# Patient Record
Sex: MALE | Race: WHITE | Employment: FULL TIME | URBAN - METROPOLITAN AREA
[De-identification: names, ages, dates, MRNs, and addresses within clinical notes are randomized per-mention and may not be internally consistent; named-entity substitution may affect disease eponyms.]

---

## 2019-04-01 ENCOUNTER — OFFICE VISIT (OUTPATIENT)
Dept: CARDIOLOGY CLINIC | Age: 54
End: 2019-04-01

## 2019-04-01 VITALS
OXYGEN SATURATION: 98 % | SYSTOLIC BLOOD PRESSURE: 122 MMHG | WEIGHT: 220.6 LBS | HEIGHT: 70 IN | DIASTOLIC BLOOD PRESSURE: 82 MMHG | HEART RATE: 73 BPM | BODY MASS INDEX: 31.58 KG/M2

## 2019-04-01 DIAGNOSIS — E78.2 MIXED HYPERLIPIDEMIA: ICD-10-CM

## 2019-04-01 DIAGNOSIS — Z82.49 FAMILY HISTORY OF ISCHEMIC HEART DISEASE: ICD-10-CM

## 2019-04-01 DIAGNOSIS — R73.03 PREDIABETES: ICD-10-CM

## 2019-04-01 DIAGNOSIS — K21.9 GASTROESOPHAGEAL REFLUX DISEASE WITHOUT ESOPHAGITIS: ICD-10-CM

## 2019-04-01 DIAGNOSIS — R23.2 FLUSHING: Primary | ICD-10-CM

## 2019-04-01 RX ORDER — LEVOTHYROXINE SODIUM 75 UG/1
TABLET ORAL
COMMUNITY

## 2019-04-01 NOTE — PROGRESS NOTES
Jennifer Tatum [address]  [address]  [place], [address] [facility]    Office [phone]  Fax [phone]  Cell [phone]         Emaline April is a 52 y.o. male self referred for evaluation of flushing. Diagnoses  Encounter Diagnoses   Name Primary?  Family history of ischemic heart disease     Mixed hyperlipidemia     Flushing Yes    Prediabetes     Gastroesophageal reflux disease without esophagitis        Assessment/Recommendations:    Spells of flushing around meal times suggest more hormonal issue than CV problem. Recently dx with hypothyroidism, started on replacement Rx. Consider endocrinology evaluation if sx progress. Intermediate CAD risk (dyslipidemia, prediabetes, family hx (brother) - favor CT heart scan. He is already on aspirin and statin Rx    GERD - persistent sx on PPI rx    I will see him back as needed    Subjective:  No prior cardiac hx. I saw him years ago for chest pain - normal stress nuclear study. He has dyslipidemia/prediabetes/GERD, recently dx hypothyroidism. Reports episodes of flushing when starts to eat food. Has intermittent, almost daily GERD despite PPI. Red tomato sauce biggest culprit. Patient denies any exertional chest pain, dyspnea, palpitations, syncope, orthopnea, edema or paroxysmal nocturnal dyspnea. Cardiac risk factors   HTN no  DM  yes  Smoking no  Family hx of CAD yes    SH:     Cardiac testing  No specialty comments available. Past Medical History:   Diagnosis Date    Family history of ischemic heart disease 1/10/2011    General fatigue 1/10/2011    Mixed hyperlipidemia 1/10/2011        Current Outpatient Medications   Medication Sig Dispense Refill    levothyroxine (SYNTHROID) 75 mcg tablet Take  by mouth Daily (before breakfast).  zolpidem (AMBIEN) 5 mg tablet Take 10 mg by mouth nightly as needed.  rosuvastatin (CRESTOR) 10 mg tablet Take 10 mg by mouth daily.       FLUTICASONE PROPIONATE (FLONASE NA) by Nasal route.  aspirin 81 mg tablet Take  by mouth daily.  multivitamin (ONE A DAY) tablet Take 1 Tab by mouth daily.  pantoprazole (PROTONIX) 40 mg tablet Take 40 mg by mouth daily.  cetirizine (ZYRTEC) 10 mg tablet Take  by mouth daily. Allergies   Allergen Reactions    Amoxicillin Unknown (comments)    Azithromycin Unknown (comments)    Bactrim [Sulfamethoprim Ds] Unknown (comments)    Bee Sting [Sting, Bee] Unknown (comments)    Biaxin [Clarithromycin] Unknown (comments)    Lipitor [Atorvastatin] Other (comments)     Elevated LFT's.  Neomycin Unknown (comments)    Neosporin [Neomycin-Bacitracin-Polymyxin] Unknown (comments)    Percocet [Oxycodone-Acetaminophen] Unknown (comments)    Zocor [Simvastatin] Other (comments)     Elevated LFT's. Review of Symptoms:  Constitutional: negative for fevers, chills, malaise, anorexia and weight loss  Respiratory: negative for cough, sputum, hemoptysis or pleurisy/chest pain  Gastrointestinal: positive for dyspepsia and reflux symptoms, negative for dysphagia, odynophagia, nausea, vomiting, melena and abdominal pain  Musculoskeletal:negative for myalgias and arthralgias  Neurological: negative for seizures and memory problems        Physical Exam    Visit Vitals  /82 (BP 1 Location: Right arm, BP Patient Position: Sitting)   Pulse 73   Ht 5' 10\" (1.778 m)   Wt 220 lb 9.6 oz (100.1 kg)   SpO2 98%   BMI 31.65 kg/m²     General - WDWN, over weight  HEENT: Eyes without jaundice, Hearing intact  Neck - JVP normal, thyroid nl  Cardiac - normal S1,S2, no murmurs, rubs or gallops.  No clicks  Vascular - carotids without bruits, radials, femorals and pedal pulses equal bilateral  Lungs - clear to auscultation bilaterals, no rales ,wheezing or rhonchi  Abd - soft nontender, no HSM, no abd bruits  Extremities - no edema  Neuro - nonfocal, normal gait  Psych - mood and affect normal    Cardiographics    ECG: normal EKG, normal sinus rhythm

## 2019-04-01 NOTE — PROGRESS NOTES
Patient has shortness of breath   Patient says he sweats a lot and has no energy.   Patient says that getting up he feels dizzy     Visit Vitals  /82 (BP 1 Location: Right arm, BP Patient Position: Sitting)   Pulse 73   Ht 5' 10\" (1.778 m)   Wt 220 lb 9.6 oz (100.1 kg)   SpO2 98%   BMI 31.65 kg/m²

## 2019-12-16 ENCOUNTER — HOSPITAL ENCOUNTER (OUTPATIENT)
Dept: CT IMAGING | Age: 54
Discharge: HOME | End: 2019-12-16
Attending: NURSE PRACTITIONER
Payer: COMMERCIAL

## 2019-12-16 DIAGNOSIS — R10.32 LEFT LOWER QUADRANT PAIN: ICD-10-CM

## 2019-12-16 PROCEDURE — 74011636320 HC RX REV CODE- 636/320: Performed by: RADIOLOGY

## 2019-12-16 PROCEDURE — 74177 CT ABD & PELVIS W/CONTRAST: CPT

## 2019-12-16 RX ADMIN — IOPAMIDOL 100 ML: 755 INJECTION, SOLUTION INTRAVENOUS at 19:03

## 2021-04-09 ENCOUNTER — APPOINTMENT (OUTPATIENT)
Dept: CT IMAGING | Age: 56
End: 2021-04-09
Attending: STUDENT IN AN ORGANIZED HEALTH CARE EDUCATION/TRAINING PROGRAM
Payer: COMMERCIAL

## 2021-04-09 ENCOUNTER — HOSPITAL ENCOUNTER (EMERGENCY)
Age: 56
Discharge: HOME | End: 2021-04-09
Attending: STUDENT IN AN ORGANIZED HEALTH CARE EDUCATION/TRAINING PROGRAM
Payer: COMMERCIAL

## 2021-04-09 VITALS
DIASTOLIC BLOOD PRESSURE: 76 MMHG | OXYGEN SATURATION: 98 % | TEMPERATURE: 98.5 F | HEART RATE: 86 BPM | SYSTOLIC BLOOD PRESSURE: 122 MMHG | RESPIRATION RATE: 16 BRPM

## 2021-04-09 DIAGNOSIS — N20.1 LEFT URETERAL STONE: Primary | ICD-10-CM

## 2021-04-09 LAB
ALBUMIN SERPL-MCNC: 3.9 G/DL (ref 3.5–5)
ALBUMIN/GLOB SERPL: 1 {RATIO} (ref 1.1–2.2)
ALP SERPL-CCNC: 103 U/L (ref 45–117)
ALT SERPL-CCNC: 47 U/L (ref 12–78)
ANION GAP BLD CALC-SCNC: 16 MMOL/L (ref 10–20)
ANION GAP SERPL CALC-SCNC: 4 MMOL/L (ref 5–15)
AST SERPL-CCNC: 30 U/L (ref 15–37)
BASOPHILS # BLD: 0.1 K/UL (ref 0–0.1)
BASOPHILS NFR BLD: 1 % (ref 0–1)
BILIRUB SERPL-MCNC: 0.9 MG/DL (ref 0.2–1)
BUN BLD-MCNC: 22 MG/DL (ref 9–20)
BUN SERPL-MCNC: 23 MG/DL (ref 6–20)
BUN/CREAT SERPL: 17 (ref 12–20)
CA-I BLD-MCNC: 1.25 MMOL/L (ref 1.12–1.32)
CALCIUM SERPL-MCNC: 9.1 MG/DL (ref 8.5–10.1)
CHLORIDE BLD-SCNC: 102 MMOL/L (ref 98–107)
CHLORIDE SERPL-SCNC: 106 MMOL/L (ref 97–108)
CO2 BLD-SCNC: 28 MMOL/L (ref 21–32)
CO2 SERPL-SCNC: 29 MMOL/L (ref 21–32)
COMMENT, HOLDF: NORMAL
CREAT BLD-MCNC: 1.3 MG/DL (ref 0.6–1.3)
CREAT SERPL-MCNC: 1.32 MG/DL (ref 0.7–1.3)
DIFFERENTIAL METHOD BLD: ABNORMAL
EOSINOPHIL # BLD: 0.1 K/UL (ref 0–0.4)
EOSINOPHIL NFR BLD: 2 % (ref 0–7)
ERYTHROCYTE [DISTWIDTH] IN BLOOD BY AUTOMATED COUNT: 13.1 % (ref 11.5–14.5)
GLOBULIN SER CALC-MCNC: 3.9 G/DL (ref 2–4)
GLUCOSE BLD-MCNC: 131 MG/DL (ref 65–100)
GLUCOSE SERPL-MCNC: 145 MG/DL (ref 65–100)
HCT VFR BLD AUTO: 44.3 % (ref 36.6–50.3)
HCT VFR BLD CALC: 43 % (ref 36.6–50.3)
HGB BLD-MCNC: 14.8 G/DL (ref 12.1–17)
IMM GRANULOCYTES # BLD AUTO: 0 K/UL (ref 0–0.04)
IMM GRANULOCYTES NFR BLD AUTO: 0 % (ref 0–0.5)
LIPASE SERPL-CCNC: 79 U/L (ref 73–393)
LYMPHOCYTES # BLD: 2.8 K/UL (ref 0.8–3.5)
LYMPHOCYTES NFR BLD: 33 % (ref 12–49)
MCH RBC QN AUTO: 31.6 PG (ref 26–34)
MCHC RBC AUTO-ENTMCNC: 33.4 G/DL (ref 30–36.5)
MCV RBC AUTO: 94.5 FL (ref 80–99)
MONOCYTES # BLD: 1.1 K/UL (ref 0–1)
MONOCYTES NFR BLD: 13 % (ref 5–13)
NEUTS SEG # BLD: 4.4 K/UL (ref 1.8–8)
NEUTS SEG NFR BLD: 51 % (ref 32–75)
NRBC # BLD: 0 K/UL (ref 0–0.01)
NRBC BLD-RTO: 0 PER 100 WBC
PLATELET # BLD AUTO: 325 K/UL (ref 150–400)
PMV BLD AUTO: 9.8 FL (ref 8.9–12.9)
POTASSIUM BLD-SCNC: 3.9 MMOL/L (ref 3.5–5.1)
POTASSIUM SERPL-SCNC: 3.8 MMOL/L (ref 3.5–5.1)
PROT SERPL-MCNC: 7.8 G/DL (ref 6.4–8.2)
RBC # BLD AUTO: 4.69 M/UL (ref 4.1–5.7)
SAMPLES BEING HELD,HOLD: NORMAL
SERVICE CMNT-IMP: ABNORMAL
SODIUM BLD-SCNC: 141 MMOL/L (ref 136–145)
SODIUM SERPL-SCNC: 139 MMOL/L (ref 136–145)
WBC # BLD AUTO: 8.5 K/UL (ref 4.1–11.1)

## 2021-04-09 PROCEDURE — 36415 COLL VENOUS BLD VENIPUNCTURE: CPT

## 2021-04-09 PROCEDURE — 99284 EMERGENCY DEPT VISIT MOD MDM: CPT

## 2021-04-09 PROCEDURE — 83690 ASSAY OF LIPASE: CPT

## 2021-04-09 PROCEDURE — 80047 BASIC METABLC PNL IONIZED CA: CPT

## 2021-04-09 PROCEDURE — 96375 TX/PRO/DX INJ NEW DRUG ADDON: CPT

## 2021-04-09 PROCEDURE — 80053 COMPREHEN METABOLIC PANEL: CPT

## 2021-04-09 PROCEDURE — 85025 COMPLETE CBC W/AUTO DIFF WBC: CPT

## 2021-04-09 PROCEDURE — 74011250636 HC RX REV CODE- 250/636: Performed by: STUDENT IN AN ORGANIZED HEALTH CARE EDUCATION/TRAINING PROGRAM

## 2021-04-09 PROCEDURE — 96374 THER/PROPH/DIAG INJ IV PUSH: CPT

## 2021-04-09 PROCEDURE — 74176 CT ABD & PELVIS W/O CONTRAST: CPT

## 2021-04-09 RX ORDER — NAPROXEN 500 MG/1
500 TABLET ORAL 2 TIMES DAILY WITH MEALS
Qty: 20 TAB | Refills: 0 | Status: SHIPPED | OUTPATIENT
Start: 2021-04-09

## 2021-04-09 RX ORDER — ONDANSETRON 2 MG/ML
4 INJECTION INTRAMUSCULAR; INTRAVENOUS
Status: COMPLETED | OUTPATIENT
Start: 2021-04-09 | End: 2021-04-09

## 2021-04-09 RX ORDER — ROSUVASTATIN CALCIUM 40 MG/1
TABLET, COATED ORAL
COMMUNITY
Start: 2021-02-26

## 2021-04-09 RX ORDER — ONDANSETRON 4 MG/1
4 TABLET, ORALLY DISINTEGRATING ORAL
Qty: 12 TAB | Refills: 0 | Status: SHIPPED | OUTPATIENT
Start: 2021-04-09

## 2021-04-09 RX ORDER — LEVOTHYROXINE SODIUM 88 UG/1
TABLET ORAL
COMMUNITY
Start: 2021-02-26

## 2021-04-09 RX ORDER — KETOROLAC TROMETHAMINE 30 MG/ML
30 INJECTION, SOLUTION INTRAMUSCULAR; INTRAVENOUS
Status: COMPLETED | OUTPATIENT
Start: 2021-04-09 | End: 2021-04-09

## 2021-04-09 RX ORDER — FENTANYL CITRATE 50 UG/ML
50 INJECTION, SOLUTION INTRAMUSCULAR; INTRAVENOUS
Status: COMPLETED | OUTPATIENT
Start: 2021-04-09 | End: 2021-04-09

## 2021-04-09 RX ORDER — HYDROCODONE BITARTRATE AND ACETAMINOPHEN 5; 325 MG/1; MG/1
1 TABLET ORAL
Qty: 20 TAB | Refills: 0 | Status: SHIPPED | OUTPATIENT
Start: 2021-04-09 | End: 2021-04-14

## 2021-04-09 RX ORDER — TAMSULOSIN HYDROCHLORIDE 0.4 MG/1
0.4 CAPSULE ORAL DAILY
Qty: 15 CAP | Refills: 0 | Status: SHIPPED | OUTPATIENT
Start: 2021-04-09 | End: 2021-04-24

## 2021-04-09 RX ADMIN — FENTANYL CITRATE 50 MCG: 50 INJECTION, SOLUTION INTRAMUSCULAR; INTRAVENOUS at 03:08

## 2021-04-09 RX ADMIN — ONDANSETRON 4 MG: 2 INJECTION INTRAMUSCULAR; INTRAVENOUS at 03:07

## 2021-04-09 RX ADMIN — SODIUM CHLORIDE 1000 ML: 9 INJECTION, SOLUTION INTRAVENOUS at 03:06

## 2021-04-09 RX ADMIN — KETOROLAC TROMETHAMINE 30 MG: 30 INJECTION, SOLUTION INTRAMUSCULAR at 03:07

## 2021-04-09 RX ADMIN — SODIUM CHLORIDE 1000 ML: 9 INJECTION, SOLUTION INTRAVENOUS at 03:30

## 2021-04-09 NOTE — DISCHARGE INSTRUCTIONS
[facility] Kidney Stone Hotline    The [facility] Hotline is a resource to assist you when experiencing the symptoms of a kidney stone. Call the [facility] hotline at [phone]). When you call this number, a staff member will coordinate appropriate care by either scheduling you a timely appointment at our office or directing you to immediate care, as needed.

## 2021-04-09 NOTE — ED PROVIDER NOTES
HISTORY OF PRESENT ILLNESS:  60 y.o. male  presents with chief complaint of Abdominal Pain    Location: Left lower quadrant, acute onset around midnight (3 hours ago)  Quality: Achy, sharp  Severity: Severe  Duration: 3 hours  Timing: Constant  Context: While laying in bed watching TV  Modifying factors: Not modified by movement  Associated symptoms: Had one episode of vomiting, vomited regurgitated food from dinner, no blood. Denies associated diarrhea. No hematuria. Denies lower urinary tract symptoms recently. There are no other acute medical concerns at this time. Chart Review: Previous CT abdomen pelvis was reviewed, left lower quadrant pain at that time but no causative etiology was identified    PCP: Ronny Levine MD      No current facility-administered medications on file prior to encounter. Current Outpatient Medications on File Prior to Encounter   Medication Sig Dispense Refill    Synthroid 88 mcg tablet       rosuvastatin (CRESTOR) 40 mg tablet       zolpidem (AMBIEN) 5 mg tablet Take 10 mg by mouth nightly as needed.  FLUTICASONE PROPIONATE (FLONASE NA) by Nasal route.  aspirin 81 mg tablet Take  by mouth daily.  multivitamin (ONE A DAY) tablet Take 1 Tab by mouth daily.  pantoprazole (PROTONIX) 40 mg tablet Take 40 mg by mouth daily.  cetirizine (ZYRTEC) 10 mg tablet Take  by mouth daily.  levothyroxine (SYNTHROID) 75 mcg tablet Take  by mouth Daily (before breakfast).  rosuvastatin (CRESTOR) 10 mg tablet Take 10 mg by mouth daily. Tobacco Use    Smoking  Past Medical History:   Diagnosis Date    Family history of ischemic heart disease 1/10/2011    General fatigue 1/10/2011    Mixed hyperlipidemia 1/10/2011    status: Never Smoker    Smokeless tobacco: Never Used   Substance Use Topics    Alcohol use: No    Drug use: Not on file                               Biaxin [No family history on file. [facility]  Social History Tobacco Use    Smoking status: Never Smoker    Smokeless tobacco: Never Used   Substance Use Topics    Alcohol use: No    Drug use: Not on file   omycin] Unknown (comments)    Lipitor [Atorvastatin] Other (comments)     Elevated LFT's.  Neomycin Unknown (comments)    Neosporin [Neomycin-Bacitracin-Polymyxin] Unknown (comments)    Percocet [Oxycodone-Acetaminophen] Unknown (comments)    Zocor [Simvastatin] Other (comments)     Elevated LFT's. REVIEW OF SYSTEMS:   Review of Systems     PHYSICAL EXAM:   Physical Exam  Constitutional:       Appearance: He is obese. Comments: Mild distress secondary to pain   HENT:      Head: Normocephalic. Eyes:      Extraocular Movements: Extraocular movements intact. Pupils: Pupils are equal, round, and reactive to light. Cardiovascular:      Rate and Rhythm: Normal rate and regular rhythm. Abdominal:      Palpations: Abdomen is soft. There is no shifting dullness or fluid wave. Tenderness: There is abdominal tenderness ( No significant tenderness in the left lower quadrant) in the left upper quadrant and left lower quadrant. There is guarding ( Voluntary guarding left lower quadrant ). There is no right CVA tenderness or left CVA tenderness. Skin:     General: Skin is warm and dry. Capillary Refill: Capillary refill takes less than 2 seconds. Coloration: Skin is not jaundiced. Neurological:      General: No focal deficit present. Mental Status: He is alert and oriented to person, place, and time. Cranial Nerves: No cranial nerve deficit. Motor: No weakness. Psychiatric:         Mood and Affect: Mood normal.          LABORATORY TESTS:  Labs Reviewed   CBC WITH AUTOMATED DIFF - Abnormal; Notable for the following components:       Result Value    ABS.  MONOCYTES 1.1 (*)     All other components within normal limits   METABOLIC PANEL, COMPREHENSIVE - Abnormal; Notable for the following components:    Anion gap 4 (*)     Glucose 145 (*)     BUN 23 (*)     Creatinine 1.32 (*)     GFR est non-AA 56 (*)     A-G Ratio 1.0 (*)     All other components within normal limits   POC CHEM8 - Abnormal; Notable for the following components:    Glucose (POC) 131 (*)     BUN (POC) 22 (*)     GFRNA, POC 57 (*)     All other components within normal limits   URINE CULTURE HOLD SAMPLE   SAMPLES BEING HELD   LIPASE   URINALYSIS W/MICROSCOPIC   POC CHEM8       IMAGING RESULTS:  CT ABD PELV WO CONT   Final Result   2 mm distal left ureteral calculus produces mild left-sided hydronephrosis and   hydroureter. EKG:  none completed    MEDICATIONS GIVEN:  Medications   fentaNYL citrate (PF) injection 50 mcg (50 mcg IntraVENous Given 21 0308)   ketorolac (TORADOL) injection 30 mg (30 mg IntraVENous Given 21 0307)   sodium chloride 0.9 % bolus infusion 1,000 mL (1,000 mL IntraVENous New Bag 21 0306)     Followed by   sodium chloride 0.9 % bolus infusion 1,000 mL (1,000 mL IntraVENous New Bag 21 0330)   ondansetron (ZOFRAN) injection 4 mg (4 mg IntraVENous Given 21 0307)       CONSULTS:  Discussed with family at bedside    PROGRESS NOTE:   4:28 AM Patient's symptoms have improved after treatment      MEDICAL DECISION MAKIN y.o. male presents with Abdominal Pain    The patient presents with abdominal pain with a differential diagnosis of abdominal pain, appendicitis, biliary colic, cholecystitis, diverticulitis, ectopic pregnancy, gastritis, gastroenteritis, GERD, obstruction, pancreatitis and renal Colic      Ct with small obstructing ureteral stone    Feeling much better 4:30 AM   Will dc with sx treatment and uro follow up       IMPRESSION:  1. Left ureteral stone        PLAN:  -   Discharge  Current Discharge Medication List      START taking these medications    Details   ondansetron (ZOFRAN ODT) 4 mg disintegrating tablet Take 1 Tab by mouth every eight (8) hours as needed for Nausea.   Qty: 12 Tab, Refills: 0 naproxen (NAPROSYN) 500 mg tablet Take 1 Tab by mouth two (2) times daily (with meals). Qty: 20 Tab, Refills: 0      HYDROcodone-acetaminophen (Norco) 5-325 mg per tablet Take 1 Tab by mouth every six (6) hours as needed for Pain for up to 5 days. Max Daily Amount: 4 Tabs. Qty: 20 Tab, Refills: 0    Associated Diagnoses: Left ureteral stone      tamsulosin (Flomax) 0.4 mg capsule Take 1 Cap by mouth daily for 15 days.   Qty: 15 Cap, Refills: 0           Follow-up Information     Follow up With Specialties Details Why Contact Info    Paulette Mckinney MD Family Medicine Schedule an appointment as soon as possible for a visit in 3 days  [address] [place] [phone] [facility]  Schedule an appointment as soon as possible for a visit   [address]  [place] [id number]        Return precautions given      Haja Roche MD

## 2022-10-31 NOTE — Clinical Note
[address] 
[facility] EMERGENCY DEPT 
[address] [facility] [place] [address] [phone] Work/School Note Date: 4/9/2021 To Whom It May concern: 
 
Argelia Montoya was seen and treated today in the emergency room by the following provider(s): 
Attending Provider: Pee Urias MD.   
 
Argelia Montoya is excused from work/school on 04/09/21 and 04/10/21. He is medically clear to return to work/school on 4/11/2021. Sincerely, Nicolette Camilo MD  
 
 

Tachycardia

## 2023-05-25 RX ORDER — NAPROXEN 500 MG/1
500 TABLET ORAL 2 TIMES DAILY WITH MEALS
COMMUNITY
Start: 2021-04-09

## 2023-05-25 RX ORDER — LEVOTHYROXINE SODIUM 88 UG/1
TABLET ORAL
COMMUNITY
Start: 2021-02-26

## 2023-05-25 RX ORDER — ONDANSETRON 4 MG/1
4 TABLET, ORALLY DISINTEGRATING ORAL EVERY 8 HOURS PRN
COMMUNITY
Start: 2021-04-09

## 2023-05-25 RX ORDER — ZOLPIDEM TARTRATE 5 MG/1
10 TABLET ORAL
COMMUNITY

## 2023-05-25 RX ORDER — ROSUVASTATIN CALCIUM 10 MG/1
10 TABLET, COATED ORAL DAILY
COMMUNITY

## 2023-05-25 RX ORDER — CETIRIZINE HYDROCHLORIDE 10 MG/1
TABLET ORAL DAILY
COMMUNITY

## 2023-05-25 RX ORDER — LEVOTHYROXINE SODIUM 0.07 MG/1
TABLET ORAL
COMMUNITY

## 2023-05-25 RX ORDER — PANTOPRAZOLE SODIUM 40 MG/1
40 TABLET, DELAYED RELEASE ORAL DAILY
COMMUNITY

## 2023-05-25 RX ORDER — ROSUVASTATIN CALCIUM 40 MG/1
TABLET, COATED ORAL
COMMUNITY
Start: 2021-02-26

## 2024-04-19 NOTE — ED TRIAGE NOTES
Called [facility] on 4/19/24 and they stated no pre authorization was required. Patient will just have a co-pay of $40.00.    Orlando Saleh   Triage: Pt advises he has had left sided abd pain since 0030. Pt advises he had a small BM around 0100.